# Patient Record
Sex: MALE | Race: OTHER | Employment: OTHER | URBAN - METROPOLITAN AREA
[De-identification: names, ages, dates, MRNs, and addresses within clinical notes are randomized per-mention and may not be internally consistent; named-entity substitution may affect disease eponyms.]

---

## 2020-02-20 PROBLEM — N20.0 STAGHORN CALCULUS: Status: ACTIVE | Noted: 2020-02-20

## 2022-12-28 ENCOUNTER — HOSPITAL ENCOUNTER (OUTPATIENT)
Dept: PREADMISSION TESTING | Age: 67
Discharge: HOME | End: 2022-12-28

## 2022-12-28 VITALS — HEIGHT: 69 IN | BODY MASS INDEX: 20.29 KG/M2 | WEIGHT: 137 LBS

## 2022-12-28 RX ORDER — TAMSULOSIN HYDROCHLORIDE 0.4 MG/1
0.4 CAPSULE ORAL DAILY
COMMUNITY
End: 2023-01-03

## 2022-12-28 RX ORDER — SODIUM CHLORIDE, SODIUM LACTATE, POTASSIUM CHLORIDE, CALCIUM CHLORIDE 600; 310; 30; 20 MG/100ML; MG/100ML; MG/100ML; MG/100ML
125 INJECTION, SOLUTION INTRAVENOUS CONTINUOUS
Status: CANCELLED | OUTPATIENT
Start: 2022-12-28

## 2022-12-28 RX ORDER — LEVOFLOXACIN 5 MG/ML
500 INJECTION, SOLUTION INTRAVENOUS ONCE
Status: CANCELLED | OUTPATIENT
Start: 2022-12-28 | End: 2022-12-28

## 2022-12-28 RX ORDER — AMOXICILLIN AND CLAVULANATE POTASSIUM 875; 125 MG/1; MG/1
TABLET, FILM COATED ORAL EVERY 12 HOURS
COMMUNITY
End: 2023-01-13

## 2022-12-28 NOTE — PERIOP NOTES
PAT phone interview completed. Patient made aware to be NPO. Patient stated he has two doses of COVID vaccination and boosters. Patient made aware to bring proof of COVID vaccination on DOS and not to get COVID test. Patient made aware not to wear jewelry, lotion, oil or spray on DOS. Reviewed surgical skin preparation with patient. Patient stated understanding. Opportunity for questions given. Reviewed expectation of discharge and length of stay. Patient stated he has a ride for discharge and someone to stay with him for 24 hours after procedure. Patient denies MICHAEL, difficult intubation/airway, PONV, MH, pseudocholinesterase deficiency, research studies or clinical trials, prosthetics, or implantable devices. Patient stated he does not have a DNR order. Patient stated he will come 12/19 for labs and EKG.

## 2022-12-29 ENCOUNTER — TRANSCRIBE ORDER (OUTPATIENT)
Dept: REGISTRATION | Age: 67
End: 2022-12-29

## 2022-12-29 ENCOUNTER — HOSPITAL ENCOUNTER (OUTPATIENT)
Dept: PREADMISSION TESTING | Age: 67
Discharge: HOME | End: 2022-12-29
Payer: MEDICARE

## 2022-12-29 DIAGNOSIS — N20.0 URIC ACID NEPHROLITHIASIS: ICD-10-CM

## 2022-12-29 DIAGNOSIS — N20.0 URIC ACID NEPHROLITHIASIS: Primary | ICD-10-CM

## 2022-12-29 LAB
ANION GAP SERPL CALC-SCNC: 5 MMOL/L (ref 3–18)
ATRIAL RATE: 59 BPM
BUN SERPL-MCNC: 8 MG/DL (ref 7–18)
BUN/CREAT SERPL: 10 (ref 12–20)
CALCIUM SERPL-MCNC: 9.9 MG/DL (ref 8.5–10.1)
CALCULATED P AXIS, ECG09: 26 DEGREES
CALCULATED R AXIS, ECG10: 35 DEGREES
CALCULATED T AXIS, ECG11: 37 DEGREES
CHLORIDE SERPL-SCNC: 106 MMOL/L (ref 100–111)
CO2 SERPL-SCNC: 30 MMOL/L (ref 21–32)
CREAT SERPL-MCNC: 0.84 MG/DL (ref 0.6–1.3)
DIAGNOSIS, 93000: NORMAL
ERYTHROCYTE [DISTWIDTH] IN BLOOD BY AUTOMATED COUNT: 13 % (ref 11.6–14.5)
GLUCOSE SERPL-MCNC: 111 MG/DL (ref 74–99)
HCT VFR BLD AUTO: 44.8 % (ref 36–48)
HGB BLD-MCNC: 14.8 G/DL (ref 13–16)
MCH RBC QN AUTO: 30 PG (ref 24–34)
MCHC RBC AUTO-ENTMCNC: 33 G/DL (ref 31–37)
MCV RBC AUTO: 90.9 FL (ref 78–100)
NRBC # BLD: 0 K/UL (ref 0–0.01)
NRBC BLD-RTO: 0 PER 100 WBC
P-R INTERVAL, ECG05: 168 MS
PLATELET # BLD AUTO: 334 K/UL (ref 135–420)
PMV BLD AUTO: 9.3 FL (ref 9.2–11.8)
POTASSIUM SERPL-SCNC: 4.7 MMOL/L (ref 3.5–5.5)
Q-T INTERVAL, ECG07: 392 MS
QRS DURATION, ECG06: 92 MS
QTC CALCULATION (BEZET), ECG08: 388 MS
RBC # BLD AUTO: 4.93 M/UL (ref 4.35–5.65)
SODIUM SERPL-SCNC: 141 MMOL/L (ref 136–145)
VENTRICULAR RATE, ECG03: 59 BPM
WBC # BLD AUTO: 7.7 K/UL (ref 4.6–13.2)

## 2022-12-29 PROCEDURE — 80048 BASIC METABOLIC PNL TOTAL CA: CPT

## 2022-12-29 PROCEDURE — 36415 COLL VENOUS BLD VENIPUNCTURE: CPT

## 2022-12-29 PROCEDURE — 93005 ELECTROCARDIOGRAM TRACING: CPT

## 2022-12-29 PROCEDURE — 85027 COMPLETE CBC AUTOMATED: CPT

## 2023-01-03 ENCOUNTER — ANESTHESIA (OUTPATIENT)
Dept: SURGERY | Age: 68
End: 2023-01-03
Payer: MEDICARE

## 2023-01-03 ENCOUNTER — HOSPITAL ENCOUNTER (OUTPATIENT)
Age: 68
Setting detail: OUTPATIENT SURGERY
Discharge: HOME | End: 2023-01-03
Attending: UROLOGY | Admitting: UROLOGY
Payer: MEDICARE

## 2023-01-03 ENCOUNTER — APPOINTMENT (OUTPATIENT)
Dept: GENERAL RADIOLOGY | Age: 68
End: 2023-01-03
Attending: UROLOGY
Payer: MEDICARE

## 2023-01-03 ENCOUNTER — ANESTHESIA EVENT (OUTPATIENT)
Dept: SURGERY | Age: 68
End: 2023-01-03
Payer: MEDICARE

## 2023-01-03 VITALS
RESPIRATION RATE: 15 BRPM | WEIGHT: 177.5 LBS | BODY MASS INDEX: 26.29 KG/M2 | DIASTOLIC BLOOD PRESSURE: 76 MMHG | HEIGHT: 69 IN | OXYGEN SATURATION: 100 % | HEART RATE: 62 BPM | SYSTOLIC BLOOD PRESSURE: 149 MMHG | TEMPERATURE: 97.2 F

## 2023-01-03 DIAGNOSIS — N20.0 STAGHORN CALCULUS: Primary | ICD-10-CM

## 2023-01-03 PROCEDURE — C1758 CATHETER, URETERAL: HCPCS | Performed by: UROLOGY

## 2023-01-03 PROCEDURE — 77030020782 HC GWN BAIR PAWS FLX 3M -B: Performed by: UROLOGY

## 2023-01-03 PROCEDURE — 74011250636 HC RX REV CODE- 250/636: Performed by: UROLOGY

## 2023-01-03 PROCEDURE — C1894 INTRO/SHEATH, NON-LASER: HCPCS | Performed by: UROLOGY

## 2023-01-03 PROCEDURE — 74011000636 HC RX REV CODE- 636: Performed by: UROLOGY

## 2023-01-03 PROCEDURE — 77030010103 HC SEAL PRT ENDOSC OCOA -B: Performed by: UROLOGY

## 2023-01-03 PROCEDURE — 2709999900 HC NON-CHARGEABLE SUPPLY: Performed by: UROLOGY

## 2023-01-03 PROCEDURE — 77030012508 HC MSK AIRWY LMA AMBU -A: Performed by: ANESTHESIOLOGY

## 2023-01-03 PROCEDURE — 74420 UROGRAPHY RTRGR +-KUB: CPT

## 2023-01-03 PROCEDURE — 76010000162 HC OR TIME 1.5 TO 2 HR INTENSV-TIER 1: Performed by: UROLOGY

## 2023-01-03 PROCEDURE — 74011250636 HC RX REV CODE- 250/636: Performed by: ANESTHESIOLOGY

## 2023-01-03 PROCEDURE — 77030040361 HC SLV COMPR DVT MDII -B: Performed by: UROLOGY

## 2023-01-03 PROCEDURE — 77030020268 HC MISC GENERAL SUPPLY: Performed by: UROLOGY

## 2023-01-03 PROCEDURE — 82360 CALCULUS ASSAY QUANT: CPT

## 2023-01-03 PROCEDURE — C1769 GUIDE WIRE: HCPCS | Performed by: UROLOGY

## 2023-01-03 PROCEDURE — 74011000250 HC RX REV CODE- 250: Performed by: NURSE ANESTHETIST, CERTIFIED REGISTERED

## 2023-01-03 PROCEDURE — 76060000034 HC ANESTHESIA 1.5 TO 2 HR: Performed by: UROLOGY

## 2023-01-03 PROCEDURE — 74011250636 HC RX REV CODE- 250/636: Performed by: NURSE ANESTHETIST, CERTIFIED REGISTERED

## 2023-01-03 PROCEDURE — 76210000006 HC OR PH I REC 0.5 TO 1 HR: Performed by: UROLOGY

## 2023-01-03 PROCEDURE — 76210000021 HC REC RM PH II 0.5 TO 1 HR: Performed by: UROLOGY

## 2023-01-03 PROCEDURE — 77030038846 HC SCPE URETSCP LITHOVUE DISP BSC -H: Performed by: UROLOGY

## 2023-01-03 PROCEDURE — 77030006974 HC BSKT URET RTVR BSC -C: Performed by: UROLOGY

## 2023-01-03 PROCEDURE — C2617 STENT, NON-COR, TEM W/O DEL: HCPCS | Performed by: UROLOGY

## 2023-01-03 DEVICE — VARIABLE LENGTH URETERAL STENT
Type: IMPLANTABLE DEVICE | Site: URETER | Status: FUNCTIONAL
Brand: CONTOUR VL™

## 2023-01-03 RX ORDER — FENTANYL CITRATE 50 UG/ML
25 INJECTION, SOLUTION INTRAMUSCULAR; INTRAVENOUS AS NEEDED
Status: DISCONTINUED | OUTPATIENT
Start: 2023-01-03 | End: 2023-01-03 | Stop reason: HOSPADM

## 2023-01-03 RX ORDER — ONDANSETRON 2 MG/ML
INJECTION INTRAMUSCULAR; INTRAVENOUS AS NEEDED
Status: DISCONTINUED | OUTPATIENT
Start: 2023-01-03 | End: 2023-01-03 | Stop reason: HOSPADM

## 2023-01-03 RX ORDER — ALFUZOSIN HYDROCHLORIDE 10 MG/1
10 TABLET, EXTENDED RELEASE ORAL DAILY
Qty: 30 TABLET | Refills: 2 | Status: SHIPPED | OUTPATIENT
Start: 2023-01-03

## 2023-01-03 RX ORDER — EPHEDRINE SULFATE/0.9% NACL/PF 50 MG/5 ML
SYRINGE (ML) INTRAVENOUS AS NEEDED
Status: DISCONTINUED | OUTPATIENT
Start: 2023-01-03 | End: 2023-01-03 | Stop reason: HOSPADM

## 2023-01-03 RX ORDER — SODIUM CHLORIDE 0.9 % (FLUSH) 0.9 %
5-40 SYRINGE (ML) INJECTION EVERY 8 HOURS
Status: DISCONTINUED | OUTPATIENT
Start: 2023-01-03 | End: 2023-01-03 | Stop reason: HOSPADM

## 2023-01-03 RX ORDER — OXYCODONE AND ACETAMINOPHEN 5; 325 MG/1; MG/1
1 TABLET ORAL
Qty: 15 TABLET | Refills: 0 | Status: SHIPPED | OUTPATIENT
Start: 2023-01-03 | End: 2023-01-08

## 2023-01-03 RX ORDER — MIDAZOLAM HYDROCHLORIDE 1 MG/ML
INJECTION, SOLUTION INTRAMUSCULAR; INTRAVENOUS AS NEEDED
Status: DISCONTINUED | OUTPATIENT
Start: 2023-01-03 | End: 2023-01-03 | Stop reason: HOSPADM

## 2023-01-03 RX ORDER — SODIUM CHLORIDE, SODIUM LACTATE, POTASSIUM CHLORIDE, CALCIUM CHLORIDE 600; 310; 30; 20 MG/100ML; MG/100ML; MG/100ML; MG/100ML
125 INJECTION, SOLUTION INTRAVENOUS CONTINUOUS
Status: DISCONTINUED | OUTPATIENT
Start: 2023-01-03 | End: 2023-01-03 | Stop reason: HOSPADM

## 2023-01-03 RX ORDER — LEVOFLOXACIN 5 MG/ML
500 INJECTION, SOLUTION INTRAVENOUS ONCE
Status: COMPLETED | OUTPATIENT
Start: 2023-01-03 | End: 2023-01-03

## 2023-01-03 RX ORDER — LIDOCAINE HYDROCHLORIDE 20 MG/ML
INJECTION, SOLUTION EPIDURAL; INFILTRATION; INTRACAUDAL; PERINEURAL AS NEEDED
Status: DISCONTINUED | OUTPATIENT
Start: 2023-01-03 | End: 2023-01-03 | Stop reason: HOSPADM

## 2023-01-03 RX ORDER — SODIUM CHLORIDE 0.9 % (FLUSH) 0.9 %
5-40 SYRINGE (ML) INJECTION AS NEEDED
Status: DISCONTINUED | OUTPATIENT
Start: 2023-01-03 | End: 2023-01-03 | Stop reason: HOSPADM

## 2023-01-03 RX ORDER — FENTANYL CITRATE 50 UG/ML
INJECTION, SOLUTION INTRAMUSCULAR; INTRAVENOUS AS NEEDED
Status: DISCONTINUED | OUTPATIENT
Start: 2023-01-03 | End: 2023-01-03 | Stop reason: HOSPADM

## 2023-01-03 RX ORDER — PROPOFOL 10 MG/ML
INJECTION, EMULSION INTRAVENOUS AS NEEDED
Status: DISCONTINUED | OUTPATIENT
Start: 2023-01-03 | End: 2023-01-03 | Stop reason: HOSPADM

## 2023-01-03 RX ORDER — HYDROMORPHONE HYDROCHLORIDE 1 MG/ML
0.5 INJECTION, SOLUTION INTRAMUSCULAR; INTRAVENOUS; SUBCUTANEOUS
Status: DISCONTINUED | OUTPATIENT
Start: 2023-01-03 | End: 2023-01-03 | Stop reason: HOSPADM

## 2023-01-03 RX ADMIN — FENTANYL CITRATE 25 MCG: 50 INJECTION, SOLUTION INTRAMUSCULAR; INTRAVENOUS at 12:54

## 2023-01-03 RX ADMIN — LEVOFLOXACIN 500 MG: 5 INJECTION, SOLUTION INTRAVENOUS at 12:21

## 2023-01-03 RX ADMIN — LIDOCAINE HYDROCHLORIDE 60 MG: 20 INJECTION, SOLUTION EPIDURAL; INFILTRATION; INTRACAUDAL; PERINEURAL at 12:23

## 2023-01-03 RX ADMIN — Medication 10 MG: at 12:34

## 2023-01-03 RX ADMIN — FENTANYL CITRATE 25 MCG: 50 INJECTION, SOLUTION INTRAMUSCULAR; INTRAVENOUS at 14:06

## 2023-01-03 RX ADMIN — FENTANYL CITRATE 25 MCG: 50 INJECTION, SOLUTION INTRAMUSCULAR; INTRAVENOUS at 13:32

## 2023-01-03 RX ADMIN — MIDAZOLAM 2 MG: 1 INJECTION INTRAMUSCULAR; INTRAVENOUS at 12:16

## 2023-01-03 RX ADMIN — FENTANYL CITRATE 50 MCG: 50 INJECTION, SOLUTION INTRAMUSCULAR; INTRAVENOUS at 12:16

## 2023-01-03 RX ADMIN — SODIUM CHLORIDE, SODIUM LACTATE, POTASSIUM CHLORIDE, AND CALCIUM CHLORIDE 125 ML/HR: 600; 310; 30; 20 INJECTION, SOLUTION INTRAVENOUS at 10:52

## 2023-01-03 RX ADMIN — FENTANYL CITRATE 25 MCG: 50 INJECTION, SOLUTION INTRAMUSCULAR; INTRAVENOUS at 13:06

## 2023-01-03 RX ADMIN — FENTANYL CITRATE 25 MCG: 50 INJECTION INTRAMUSCULAR; INTRAVENOUS at 14:45

## 2023-01-03 RX ADMIN — ONDANSETRON HYDROCHLORIDE 4 MG: 2 INJECTION INTRAMUSCULAR; INTRAVENOUS at 12:34

## 2023-01-03 RX ADMIN — PROPOFOL 160 MG: 10 INJECTION, EMULSION INTRAVENOUS at 12:23

## 2023-01-03 RX ADMIN — SODIUM CHLORIDE, SODIUM LACTATE, POTASSIUM CHLORIDE, AND CALCIUM CHLORIDE 125 ML/HR: 600; 310; 30; 20 INJECTION, SOLUTION INTRAVENOUS at 14:59

## 2023-01-03 RX ADMIN — FENTANYL CITRATE 25 MCG: 50 INJECTION INTRAMUSCULAR; INTRAVENOUS at 14:54

## 2023-01-03 RX ADMIN — SODIUM CHLORIDE, SODIUM LACTATE, POTASSIUM CHLORIDE, AND CALCIUM CHLORIDE: 600; 310; 30; 20 INJECTION, SOLUTION INTRAVENOUS at 13:04

## 2023-01-03 RX ADMIN — FENTANYL CITRATE 25 MCG: 50 INJECTION, SOLUTION INTRAMUSCULAR; INTRAVENOUS at 12:37

## 2023-01-03 NOTE — PERIOP NOTES
TRANSFER - OUT REPORT:    Verbal report given to Becky Sr RN on [facility]  being transferred to Phase 2 (unit) for routine post - op       Report consisted of patients Situation, Background, Assessment and   Recommendations(SBAR). Information from the following report(s) SBAR was reviewed with the receiving nurse. Lines:   Peripheral IV 01/03/23 Anterior; Left Forearm (Active)   Site Assessment Clean, dry, & intact 01/03/23 1419   Phlebitis Assessment 0 01/03/23 1419   Infiltration Assessment 0 01/03/23 1419   Dressing Status Clean, dry, & intact 01/03/23 1419   Dressing Type Tape;Transparent 01/03/23 1419   Hub Color/Line Status Pink; Infusing;Patent 01/03/23 1419        Opportunity for questions and clarification was provided.       Patient transported with:   [email or web address]

## 2023-01-03 NOTE — PERIOP NOTES
Date 01/02/23 0700 - 01/03/23 0659(Not Admitted) 01/03/23 0700 - 01/04/23 0659   Shift 3396-6252 9983-1455 24 Hour Total 5634-4690 9665-1449 24 Hour Total   INTAKE   I.V.    1900  1900     I.V.    200  200     Volume (lactated Ringers infusion)    1700  1700   Shift Total(mL/kg)    1900(23.6)  1900(23.6)   OUTPUT   Urine    100  100     Urine Voided    100  100     Urine Occurrence(s)    1 x  1 x   Shift Total(mL/kg)    100(1.2)  100(1.2)   NET    1800  1800   Weight (kg)    80.5 80.5 80.5

## 2023-01-03 NOTE — ANESTHESIA POSTPROCEDURE EVALUATION
Post-Anesthesia Evaluation and Assessment    Cardiovascular Function/Vital Signs  Visit Vitals  BP (!) 143/76   Pulse 61   Temp 36.2 °C (97.2 °F)   Resp 16   Ht 5' 9\" (1.753 m)   Wt 80.5 kg (177 lb 8 oz)   SpO2 97%   BMI 26.21 kg/m²       Patient is status post Procedure(s):  CYSTOSCOPY,BILATERAL RETROGRADE PYELOGRAM,LEFT  URETEROSCOPY WITH THULIUM LASER,BILATERAL STENT PLACEMENT W/C-ARM. Nausea/Vomiting: Controlled. Postoperative hydration reviewed and adequate. Pain:  Pain Scale 1: Visual (01/03/23 1503)  Pain Intensity 1: 0 (01/03/23 1503)   Managed. Neurological Status:   Neuro (WDL): Within Defined Limits (01/03/23 1503)   At baseline. Mental Status and Level of Consciousness: Baseline and stable. Pulmonary Status:   O2 Device: None (Room air) (01/03/23 1503)   Adequate oxygenation and airway patent. Complications related to anesthesia: None    Post-anesthesia assessment completed. No concerns. Patient has met all discharge requirements.     Signed By: Kendell William MD

## 2023-01-03 NOTE — PERIOP NOTES
TRANSFER - IN REPORT:    Verbal report received from SOFY Hinkle(name) on [place]  being received from PACU(unit) for routine progression of care      Report consisted of patients Situation, Background, Assessment and   Recommendations(SBAR). Information from the following report(s) SBAR, Kardex, and MAR was reviewed with the receiving nurse. Opportunity for questions and clarification was provided. Assessment completed upon patients arrival to unit and care assumed.

## 2023-01-03 NOTE — BRIEF OP NOTE
Brief Postoperative Note    Patient: Agapito Roman  YOB: 1955  MRN: [id number]    Date of Procedure: 1/3/2023     Pre-Op Diagnosis: LEFT STAGHORN CALCULUS OF KIDNEY, RIGHT RENAL STONES    Post-Op Diagnosis: Same as preoperative diagnosis. Procedure(s):  CYSTOSCOPY, LEFT RETROGRADE PYELOGRAM WITH INTERPRETATION, LEFT  URETEROSCOPY WITH THULIUM LASER LITHOTRIPSY AND STENT PLACEMENT (COMPLICATED), RIGHT RETROGRADE PYELOGRAM WITH INTERPRETATION, RIGHT URETERAL STENT PLACEMENT W/C-ARM    Surgeon(s):  Codi Ram MD    Surgical Assistant: NONE    Anesthesia: General     Estimated Blood Loss (mL): Minimal    Complications: None    Specimens:   ID Type Source Tests Collected by Time Destination   1 : LEFT KIDNEY STONES Fresh Kidney  Codi Ram MD 1/3/2023 1408 Pathology        Implants:   Implant Name Type Inv. Item Serial No.  Lot No. LRB No. Used Action   STENT URET 4.8FR [id number] PERCFLX HYDR+ SFT PGTL TAPR TIP - [id number]  STENT URET 4.8FR [id number] PERCFLX HYDR+ SFT PGTL TAPR TIP  [employer]_ [id number] Left 1 Implanted   STENT URET 4.8FR [id number] PERCFLX HYDR+ SFT PGTL TAPR TIP - [id number]  STENT URET 4.8FR [id number] PERCFLX HYDR+ SFT PGTL TAPR TIP  [employer]_WD [id number] Right 1 Implanted       Drains: * No LDAs found *    Findings: HUGE DENSE STAGHORN STONE IN LEFT KIDNEY. LASERED INTO TINY DUST AND DEBRIS. THERE WERE NO INJURIES OR COMPLICATIONS, BUT IT WAS A VERY PROLONGED CASE AND SIGNIFICANTLY MORE DIFFICULT THAN A TYPICAL URETEROSCOPY. AT END OF CASE, THERE WAS LOTS OF FINE DEBRIS AND NO  OBVIOUS CHUNKS, BUT VISUALIZATION WAS DIFFICULT.       Electronically Signed by Orville Barthel, MD on 1/3/2023 at 2:29 PM

## 2023-01-03 NOTE — H&P
Urology [address] [address]  Tel: [phone]  Fax: [phone]    Patient : Sarah Hamlin   YOB: 1955                   Assessment/Plan  # Detail Type Description    Assessment Calculus of kidney (N20.0). Patient Plan He has a large left-sided partial staghorn stone and several renal stones on the right as well. We discussed ureteroscopy versus PCNL. After thorough discussion he will proceed with cystoscopy LEFT retrograde pyelogram and LEFT  ureteroscopy with thulium laser lithotripsy and stent placement. Risks of bleeding and infection injury and possible need for more than 1 procedure to made stone free were discussed. At the time of his left ureteroscopy I will do a right retrograde pyelogram and place a stent. Likely then in follow-up he would need a bilateral ureteroscopy a few weeks later and I would treat the right-sided stones at his 2nd procedure and also make sure that any left-sided residual fragments were treated as well. 2. Assessment Acute cystitis without hematuria (N30.00). Patient Plan He is currently doing pretty well on his amoxicillin. We discussed that there may be a bacteria that resides on the stone and certainly there is a chance for postoperative UTI. Additional Visit Information      This 79year old male presents for Kidney stones and UTI. History of Present Illness  1. Kidney stones   Onset was 3 months ago. Severity level is moderate-severe. It occurs intermittently. The problem is with no change. Location of pain is left flank. Prior stone type of calcium oxalate monohydrate and Calcium oxalate di-hydrate. Recent ER visit. Pertinent history includes history of prior stone(s). Associated symptoms include back pain, hematuria and pain.  Pertinent negatives include bloating, chills, constipation, diarrhea, dysuria, fever, nausea, pelvic pain, penile pain, suprapubic pain, urinary blood clots, dribbling (urinary), frequency (urinary), urinary straining and vomiting. Additional information: New pt today with hx of urosepsis that he believes is from a UTI after a cystoscopy. Admitted to [facility] in [address] [place] [address] treated with IV antibiotics in November 15, 2022. Patient was then seen 10/17/22 at [facility] for another episode of sepsis secondary to UTI. Patient has a known right staghorn calculus that measures 1.8 x 1.0 x 1.7 cm in the central left lower pole. He has multiple stones in the right kidney. .        2.  UTI   The onset was 2 weeks ago. The severity of the problem is moderate. The problem is improving. Pertinent history includes being over 50 and alcohol consumption. Associated symptoms include hematuria. Pertinent negatives include constipation, dysuria, dribbling, urinary frequency, pelvic pain and penile pain. Additional information: He has been on amoxicillin 875 mg for 10 days total starting 12/14/2022.       12/21/2022 -- he denies any current fevers. Appetite is okay. Medical/Surgical/Interim History  Reviewed, no change. Last detailed document date:12/15/2022. Problem List  Problem List reviewed. Medications (active prior to today)  Medication Instructions Start Date Stop Date Refilled Elsewhere   tamsulosin 0.4 mg capsule take 1 capsule by oral route  every day 1/2 hour following the same meal each day 12/15/2022   N       Medication Reconciliation  Medications reconciled today.     Medication Reviewed  Adherence Medication Name Sig Desc Elsewhere Status   taking as directed tamsulosin 0.4 mg capsule take 1 capsule by oral route  every day 1/2 hour following the same meal each day N Verified   taking as directed amoxicillin 875 mg-potassium clavulanate 125 mg tablet take 1 tablet by oral route  every 12 hours   -- start 2 days before surgery N Verified     Allergies  Ingredient Reaction (Severity) Medication Name Comment   NO KNOWN ALLERGIES        Reviewed, no changes. Family History  Reviewed, no changes. Last detailed document date:12/15/2022. Social History  Reviewed, no changes. Last detailed document date: 12/15/2022. Review of Systems  System Neg/Pos Details   Constitutional Positive Pain. Constitutional Negative Chills and Fever. ENMT Negative Ear infections and Sore throat. Eyes Negative Blurred vision, Double vision and Eye pain. Respiratory Negative Asthma, Chronic cough, Dyspnea and Wheezing. Cardio Negative Chest pain. GI Negative Bloating, Constipation, Decreased appetite, Diarrhea, Nausea and Vomiting.  Positive Back pain, Hematuria.  Negative Dysuria, Pelvic pain, Penile pain, Suprapubic pain, Urinary blood clots, Urinary dribbling, Urinary frequency and Urinary straining. Endocrine Negative Cold intolerance, Heat intolerance, Increased thirst and Weight loss. Neuro Negative Headache and Tremors. Psych Negative Anxiety and Depression. Integumentary Negative Itching skin and Rash. MS Negative Back pain and Joint pain. Hema/Lymph Negative Easy bleeding. Reproductive Negative Sexual dysfunction. Vital Signs   Height  Time ft in cm Last Measured Height Position   8:34 AM 5.0 9.00 175.26 12/15/2022 0     Weight/BSA/BMI  Time lb oz kg Context BMI kg/m2 BSA m2   8:34 .00  80.286  26.14      Measured by  Time Measured by   8:34 AM Cherie Wilkins     Physical Exam  Exam Findings Details   Constitutional Normal Well developed. Neck Exam Normal Inspection - Normal.   Respiratory Normal Inspection - Normal.   Abdomen Normal No abdominal tenderness. Genitourinary Normal No CVA tenderness. No suprapubic tenderness. Extremity Normal No edema. Psychiatric Normal Orientation - Oriented to time, place, person & situation. Appropriate mood and affect.      Medications (added, continued, or stopped today)  Start Date Medication Directions PRN Status PRN Reason Instruction Stop Date 12/21/2022 amoxicillin 875 mg-potassium clavulanate 125 mg tablet take 1 tablet by oral route  every 12 hours   -- start 2 days before surgery N      12/15/2022 tamsulosin 0.4 mg capsule take 1 capsule by oral route  every day 1/2 hour following the same meal each day N          Active Patient Care Team Members  Name Contact Agency Type Support Role Relationship Active Date Inactive Date Specialty   [facility]   Patient provider PCP          Provider:        Cee Clark MD

## 2023-01-03 NOTE — PERIOP NOTES
Reviewed PTA medication list with patient/caregiver and patient/caregiver denies any additional medications. Patient admits to having a responsible adult care for them at home for at least 24 hours after surgery. Patient encouraged to use gown warming system and informed that using said warming gown to regulate body temperature prior to a procedure has been shown to help reduce the risks of blood clots and infection. Patient's pharmacy of choice verified and documented in PTA medication section.     Dual skin assessment & fall risk band verification completed with Marlys Feliciano RN.

## 2023-01-03 NOTE — DISCHARGE INSTRUCTIONS
DISCHARGE SUMMARY from Nurse    PATIENT INSTRUCTIONS:    After general anesthesia or intravenous sedation, for 24 hours or while taking prescription Narcotics:  Limit your activities  Do not drive and operate hazardous machinery  Do not make important personal or business decisions  Do  not drink alcoholic beverages  If you have not urinated within 8 hours after discharge, please contact your surgeon on call. Report the following to your surgeon:  Excessive pain, swelling, redness or odor of or around the surgical area  Temperature over 100.5  Nausea and vomiting lasting longer than 4 hours or if unable to take medications  Any signs of decreased circulation or nerve impairment to extremity: change in color, persistent  numbness, tingling, coldness or increase pain  Any questions    What to do at Home:  Recommended activity: Ambulate in house and No lifting, Driving, or Strenuous exercise until advised    *  Please give a list of your current medications to your Primary Care Provider. *  Please update this list whenever your medications are discontinued, doses are      changed, or new medications (including over-the-counter products) are added. *  Please carry medication information at all times in case of emergency situations. These are general instructions for a healthy lifestyle:    No smoking/ No tobacco products/ Avoid exposure to second hand smoke  Surgeon General's Warning:  Quitting smoking now greatly reduces serious risk to your health.     Obesity, smoking, and sedentary lifestyle greatly increases your risk for illness    A healthy diet, regular physical exercise & weight monitoring are important for maintaining a healthy lifestyle    You may be retaining fluid if you have a history of heart failure or if you experience any of the following symptoms:  Weight gain of 3 pounds or more overnight or 5 pounds in a week, increased swelling in our hands or feet or shortness of breath while lying flat in bed. Please call your doctor as soon as you notice any of these symptoms; do not wait until your next office visit. Patient armband removed and shredded     The discharge information has been reviewed with the patient and caregiver. The patient and caregiver verbalized understanding. Discharge medications reviewed with the patient and caregiver and appropriate educational materials and side effects teaching were provided. ___________________________________________________________________________________________________________________________________     Cystoscopy: What to Expect at [facility]     A cystoscopy is a procedure that lets a doctor look inside of the bladder and the urethra. The urethra is the tube that carries urine from the bladder to outside the body. The doctor uses a thin, lighted tool called a cystoscope. Your bladder is filled with fluid. This stretches the bladder so that your doctor can look closely at the inside of your bladder. After the cystoscopy, your urethra may be sore at first, and it may burn when you urinate for the first few days after the procedure. You may feel the need to urinate more often, and your urine may be pink. These symptoms should get better in 1 or 2 days. You will probably be able to go back to most of your usual activities in 1 or 2 days. This care sheet gives you a general idea about how long it will take for you to recover. But each person recovers at a different pace. Follow the steps below to get better as quickly as possible. How can you care for yourself at home? Activity    Rest when you feel tired. Getting enough sleep will help you recover. Try to walk each day. Start by walking a little more than you did the day before. Bit by bit, increase the amount you walk. Walking boosts blood flow and helps prevent pneumonia and constipation.      Avoid strenuous activities, such as bicycle riding, jogging, weight lifting, or aerobic exercise, until your doctor says it is okay. Ask your doctor when you can drive again. Most people are able to return to work within 1 or 2 days after the procedure. You may shower and take baths as usual.     Ask your doctor when it is okay for you to have sex. Diet    You can eat your normal diet. If your stomach is upset, try bland, low-fat foods like plain rice, broiled chicken, toast, and yogurt. Drink plenty of fluids (unless your doctor tells you not to). Medicines    Take pain medicines exactly as directed. If the doctor gave you a prescription medicine for pain, take it as prescribed. If you are not taking a prescription pain medicine, ask your doctor if you can take an over-the-counter medicine. If you think your pain medicine is making you sick to your stomach: Take your medicine after meals (unless your doctor has told you not to). Ask your doctor for a different pain medicine. If your doctor prescribed antibiotics, take them as directed. Do not stop taking them just because you feel better. You need to take the full course of antibiotics. Follow-up care is a key part of your treatment and safety. Be sure to make and go to all appointments, and call your doctor if you are having problems. It's also a good idea to know your test results and keep a list of the medicines you take. When should you call for help? Call 911 anytime you think you may need emergency care. For example, call if:    You passed out (lost consciousness). You have severe trouble breathing. You have sudden chest pain and shortness of breath, or you cough up blood. You have severe belly pain. Call your doctor now or seek immediate medical care if:    You are sick to your stomach or cannot keep fluids down. Your urine is still red or you see blood clots after you have urinated several times.      You have trouble passing urine or stool, especially if you have pain or swelling in your lower belly. You have signs of a blood clot, such as:  Pain in your calf, back of the knee, thigh, or groin. Redness and swelling in your leg or groin. You develop a fever or severe chills. You have pain in your back just below your rib cage. This is called flank pain. Watch closely for changes in your health, and be sure to contact your doctor if:    You have pain or burning when you urinate. A burning feeling is normal for a day or two after the test, but call if it does not get better. You have a frequent urge to urinate but can pass only small amounts of urine. Your urine is pink, red, or cloudy, or smells bad. It is normal for the urine to have a pinkish color for a few days after the test, but call if it does not get better. Where can you learn more? Go to http://www.gray.com/  Enter C842 in the search box to learn more about \"Cystoscopy: What to Expect at Home. \"  Current as of: June 16, 2022               Content Version: 13.4  © 0661-7213 [employer]. Care instructions adapted under license by [employer] (which disclaims liability or warranty for this information). If you have questions about a medical condition or this instruction, always ask your healthcare professional. [address] any warranty or liability for your use of this information.

## 2023-01-03 NOTE — ANESTHESIA PREPROCEDURE EVALUATION
Relevant Problems   RENAL FAILURE   (+) Staghorn calculus       Anesthetic History   No history of anesthetic complications            Review of Systems / Medical History  Patient summary reviewed, nursing notes reviewed and pertinent labs reviewed    Pulmonary  Within defined limits                 Neuro/Psych   Within defined limits           Cardiovascular  Within defined limits                Exercise tolerance: >4 METS     GI/Hepatic/Renal     GERD           Endo/Other        Arthritis     Other Findings              Physical Exam    Airway  Mallampati: I  TM Distance: 4 - 6 cm  Neck ROM: normal range of motion   Mouth opening: Normal     Cardiovascular  Regular rate and rhythm,  S1 and S2 normal,  no murmur, click, rub, or gallop  Rhythm: regular  Rate: normal         Dental    Dentition: Edentulous     Pulmonary  Breath sounds clear to auscultation               Abdominal  GI exam deferred       Other Findings            Anesthetic Plan    ASA: 2  Anesthesia type: general          Induction: Intravenous  Anesthetic plan and risks discussed with: Patient

## 2023-01-03 NOTE — PERIOP NOTES
TRANSFER - IN REPORT:    Verbal report received from ORN & CRNA on [facility]  being received from OR(unit) for routine post - op      Report consisted of patients Situation, Background, Assessment and   Recommendations(SBAR). Information from the following report(s) SBAR was reviewed with the receiving nurse. Opportunity for questions and clarification was provided. Assessment completed upon patients arrival to unit and care assumed.

## 2023-01-04 NOTE — OP NOTES
[facility]  OPERATIVE REPORT    Name:  Maikel Benitez  MR#:   [id number]  :  1955  ACCOUNT #:  [de-identified]  DATE OF SERVICE:  2023    PREOPERATIVE DIAGNOSIS:  Staghorn calculus, left kidney. POSTOPERATIVE DIAGNOSIS:  Staghorn calculus, left kidney. PROCEDURES PERFORMED:  Cystoscopy, left retrograde pyelogram with interpretation, left ureteroscopy with thulium laser lithotripsy and stent placement. Complicated right retrograde pyelogram with interpretation, right ureteral stent placement. SURGEON:  Zahra Fang MD    ASSISTANT:  None. ANESTHESIA:  General.    COMPLICATIONS:  None. SPECIMENS REMOVED:  Left kidney stone. IMPLANTS:  A 4.8 Italian variable-length stent bilaterally. ESTIMATED BLOOD LOSS:  Minimal.    DRAINS:  None. FINDINGS:  The patient had a huge dense staghorn stone of left kidney. It was lasered into tiny dust and debris. There were no injuries or complications but it was a very prolonged case and significantly more difficult than a typical ureteroscopy necessitating a complicated modifier. At the end of the case, there was lots of fine debris and no obvious chunks but visualization was difficult. The right-sided stent was placed without difficulty in preparation for his upcoming right ureteroscopy. PROCEDURE:  Preoperatively, risks and benefits of surgery were described to the patient. The risks included but were not limited to bleeding, infection, injury to the bladder, injury to the ureter, injury to the kidney, possible need for more than one procedure to be made stone-free. In fact given the sheer size of the stone, he was told he could almost guarantee that it would take more than one procedure to be made stone-free on the left. The patient understood the risks and signed the informed consent. The patient was taken to the operating room and placed on the OR table in supine position. He was administered general anesthetic.   He was administered intravenous antibiotics. He was placed in dorsal lithotomy position and prepped and draped in the usual sterile manner. A 22-Marshallese cystoscope and sheath were then inserted transurethrally atraumatically under direct vision using a 30-degree lens. Panendoscopy was done. Bilateral ureteral orifices were in normal anatomic position. There were no stones, no tumors, and no areas of concern. I cannulated the left ureteral orifice with a 5-Marshallese open-ended ureteral catheter and retrograde pyelogram was done in the usual manner using 15 mL of Optiray dye. There were no filling defects in the distal, mid, or proximal ureter. However in the kidney, there was a very large filling defect in the renal pelvis extending into the lower pole and upper pole. There was some upper pole dilation without blunted calyces. Next, I passed a sensor wire through the open-ended catheter up to the kidney. The open-ended catheter was removed. The scope was removed. I then passed a dual-lumen catheter and passed a second sensor wire up to the kidney. The dual-lumen catheter was removed. One wire was a safety wire, the other wire was a working wire. Over the working wire, I passed an 11/13, 46-cm navigator sheath over the wire up into the proximal ureter. Then the inner workings of the sheath and the working wire were removed. I then passed a flexible ureteroscope through the sheath up to the kidney. Panpyeloscopy was done. I immediately came upon the huge stone in the renal pelvis extending down to the UPJ. Using a 200-micron thulium laser fiber, I broke up the stone into small dust using a dusting setting. This was an extremely large dense stone that required a prolonged laser time as the surgery itself was about 2 hours. I eventually lasered so much that eventually the laser fiber failed and I had to replace it with another 200-micron thulium laser fiber.   I continued to laser the stone until at the end there was just nothing but small dust and debris, but of course, visualization was very difficult just due to the sheer volume. I went into the lower pole, into the renal pelvis, and into the upper pole, wherever stone was identified to laser. I used a Zero Tip basket to grab a few of the pieces out to be sent off for analysis. At this point, I did not see any significant chunks and the hope is that the lot of the dust and debris will just pass. I then slowly brought down my sheath and the scope down the ureter. The ureter was intact and unharmed. There were no significant fragments along the length of the ureter. I then reinserted the cystoscope. Over the safety wire, I passed a 4.8-Mongolian variable-length stent. The wire was removed. Fluoroscopy confirmed the proximal coil was in the kidney. The distal coil was noted to be in the bladder by direct vision. At this point, I then cannulated the right ureteral orifice with a 5-Mongolian open-ended ureteral catheter and retrograde pyelogram was done in the usual manner using 10 mL of Optiray dye. There were no filling defects along the entire length of the ureter or up in the kidney. No hydronephrosis. At this point, I passed a 0.035 sensor wire through the open-ended catheter up to the kidney. The open-ended catheter was removed. I then passed a 4.8-Mongolian variable-length stent over the wire up to the kidney. The wire was removed. Fluoroscopy confirmed the proximal coil was in the kidney. The distal coil was noted to be in the bladder by direct vision. At this point, the patient was taken out of lithotomy position, revived from anesthesia, and taken to Recovery in stable condition.       Bonnie Barnes MD      [id number]  D:  01/03/2023 14:43  T:  01/03/2023 23:04  JOB #:  [id number]

## 2023-01-08 LAB
CALCIUM OXALATE DIHYDRATE MFR STONE IR: 10 %
COLOR STONE: NORMAL
COM MFR STONE: 85 %
COMMENT, 120677: NORMAL
COMMENT, 519994: NORMAL
COMPOSITION, 120440: NORMAL
DISCLAIMER, STO32L: NORMAL
HYDROXYAPATITE: 5 %
PHOTO, 120675: NORMAL
PLEASE NOTE, 130422: NORMAL
SIZE STONE: NORMAL MM
SPECIMEN SOURCE: NORMAL
STONE ANALYSIS-IMP: NORMAL
WT STONE: 37 MG

## 2023-01-13 ENCOUNTER — HOSPITAL ENCOUNTER (OUTPATIENT)
Dept: PREADMISSION TESTING | Age: 68
Discharge: HOME | End: 2023-01-13

## 2023-01-13 VITALS — WEIGHT: 177 LBS | HEIGHT: 69 IN | BODY MASS INDEX: 26.22 KG/M2

## 2023-01-13 RX ORDER — LEVOFLOXACIN 5 MG/ML
500 INJECTION, SOLUTION INTRAVENOUS ONCE
Status: CANCELLED | OUTPATIENT
Start: 2023-01-13 | End: 2023-01-13

## 2023-01-13 RX ORDER — PHENAZOPYRIDINE HYDROCHLORIDE 200 MG/1
200 TABLET, FILM COATED ORAL
Status: ON HOLD | COMMUNITY
End: 2023-01-17 | Stop reason: SDUPTHER

## 2023-01-13 RX ORDER — KETOROLAC TROMETHAMINE 10 MG/1
TABLET, FILM COATED ORAL
Status: ON HOLD | COMMUNITY
End: 2023-01-17 | Stop reason: SDUPTHER

## 2023-01-13 RX ORDER — OXYCODONE AND ACETAMINOPHEN 5; 325 MG/1; MG/1
TABLET ORAL
Status: ON HOLD | COMMUNITY
End: 2023-01-17 | Stop reason: SDUPTHER

## 2023-01-13 RX ORDER — SODIUM CHLORIDE, SODIUM LACTATE, POTASSIUM CHLORIDE, CALCIUM CHLORIDE 600; 310; 30; 20 MG/100ML; MG/100ML; MG/100ML; MG/100ML
125 INJECTION, SOLUTION INTRAVENOUS CONTINUOUS
Status: CANCELLED | OUTPATIENT
Start: 2023-01-13

## 2023-01-13 NOTE — PERIOP NOTES
PAT - SURGICAL PRE-ADMISSION INSTRUCTIONS    NAME:  Hanny Ortega                                                          TODAY'S DATE:  1/13/2023    SURGERY DATE:  1/17/2023                                  SURGERY ARRIVAL TIME:   tba    Do NOT eat or drink anything, including candy or gum, after MIDNIGHT on 1-17 , unless you have specific instructions from your Surgeon or Anesthesia Provider to do so. No smoking 24 hours before surgery. No recreational drugs for one week prior to the day of surgery. Leave all valuables, including money/purse, at home. Remove all jewelry, nail polish, makeup (including mascara); no lotions, powders, deodorant, or perfume/cologne/after shave. Glasses/Contact lenses and Dentures may be worn to the hospital.  They will be removed prior to surgery. Call your doctor if symptoms of a cold or illness develop within 24 ours prior to surgery. AN ADULT MUST DRIVE YOU HOME AFTER OUTPATIENT SURGERY. If you are having an OUTPATIENT procedure, please make arrangements for a responsible adult to be with you for 24 hours after your surgery. If you are admitted to the hospital, you will be assigned to a bed after surgery is complete. Normally a family member will not be able to see you until you are in your assigned bed. 12. Visitation Restrictions Explained. NONE.     Patient Prep:    shower with anti-bacterial soap     These surgical instructions were reviewed with the pt during the PAT phone call

## 2023-01-17 ENCOUNTER — ANESTHESIA (OUTPATIENT)
Dept: SURGERY | Age: 68
End: 2023-01-17
Payer: MEDICARE

## 2023-01-17 ENCOUNTER — ANESTHESIA EVENT (OUTPATIENT)
Dept: SURGERY | Age: 68
End: 2023-01-17
Payer: MEDICARE

## 2023-01-17 ENCOUNTER — APPOINTMENT (OUTPATIENT)
Dept: GENERAL RADIOLOGY | Age: 68
End: 2023-01-17
Attending: UROLOGY
Payer: MEDICARE

## 2023-01-17 ENCOUNTER — HOSPITAL ENCOUNTER (OUTPATIENT)
Age: 68
Setting detail: OUTPATIENT SURGERY
Discharge: HOME | End: 2023-01-17
Attending: UROLOGY | Admitting: UROLOGY
Payer: MEDICARE

## 2023-01-17 VITALS
OXYGEN SATURATION: 100 % | SYSTOLIC BLOOD PRESSURE: 128 MMHG | DIASTOLIC BLOOD PRESSURE: 68 MMHG | BODY MASS INDEX: 26.45 KG/M2 | RESPIRATION RATE: 15 BRPM | HEIGHT: 69 IN | WEIGHT: 178.6 LBS | TEMPERATURE: 97.5 F | HEART RATE: 62 BPM

## 2023-01-17 DIAGNOSIS — N20.0 STAGHORN CALCULUS: Primary | ICD-10-CM

## 2023-01-17 PROCEDURE — 74011000250 HC RX REV CODE- 250: Performed by: SPECIALIST

## 2023-01-17 PROCEDURE — 76210000006 HC OR PH I REC 0.5 TO 1 HR: Performed by: UROLOGY

## 2023-01-17 PROCEDURE — 74011000250 HC RX REV CODE- 250: Performed by: NURSE ANESTHETIST, CERTIFIED REGISTERED

## 2023-01-17 PROCEDURE — C1894 INTRO/SHEATH, NON-LASER: HCPCS | Performed by: UROLOGY

## 2023-01-17 PROCEDURE — 74420 UROGRAPHY RTRGR +-KUB: CPT

## 2023-01-17 PROCEDURE — 76010000153 HC OR TIME 1.5 TO 2 HR: Performed by: UROLOGY

## 2023-01-17 PROCEDURE — 77030006974 HC BSKT URET RTVR BSC -C: Performed by: UROLOGY

## 2023-01-17 PROCEDURE — 77030016638: Performed by: UROLOGY

## 2023-01-17 PROCEDURE — 74011250636 HC RX REV CODE- 250/636: Performed by: SPECIALIST

## 2023-01-17 PROCEDURE — 76210000021 HC REC RM PH II 0.5 TO 1 HR: Performed by: UROLOGY

## 2023-01-17 PROCEDURE — 77030020782 HC GWN BAIR PAWS FLX 3M -B: Performed by: UROLOGY

## 2023-01-17 PROCEDURE — 74011250636 HC RX REV CODE- 250/636: Performed by: UROLOGY

## 2023-01-17 PROCEDURE — 76060000034 HC ANESTHESIA 1.5 TO 2 HR: Performed by: UROLOGY

## 2023-01-17 PROCEDURE — 2709999900 HC NON-CHARGEABLE SUPPLY: Performed by: UROLOGY

## 2023-01-17 PROCEDURE — 77030020268 HC MISC GENERAL SUPPLY: Performed by: UROLOGY

## 2023-01-17 PROCEDURE — 77030012508 HC MSK AIRWY LMA AMBU -A: Performed by: SPECIALIST

## 2023-01-17 PROCEDURE — 77030010103 HC SEAL PRT ENDOSC OCOA -B: Performed by: UROLOGY

## 2023-01-17 PROCEDURE — C2617 STENT, NON-COR, TEM W/O DEL: HCPCS | Performed by: UROLOGY

## 2023-01-17 PROCEDURE — C1758 CATHETER, URETERAL: HCPCS | Performed by: UROLOGY

## 2023-01-17 PROCEDURE — C1769 GUIDE WIRE: HCPCS | Performed by: UROLOGY

## 2023-01-17 PROCEDURE — 74011250636 HC RX REV CODE- 250/636: Performed by: NURSE ANESTHETIST, CERTIFIED REGISTERED

## 2023-01-17 PROCEDURE — 77030038846 HC SCPE URETSCP LITHOVUE DISP BSC -H: Performed by: UROLOGY

## 2023-01-17 PROCEDURE — 82360 CALCULUS ASSAY QUANT: CPT

## 2023-01-17 PROCEDURE — 74011000636 HC RX REV CODE- 636: Performed by: UROLOGY

## 2023-01-17 PROCEDURE — 77030040361 HC SLV COMPR DVT MDII -B: Performed by: UROLOGY

## 2023-01-17 DEVICE — VARIABLE LENGTH URETERAL STENT
Type: IMPLANTABLE DEVICE | Site: URETER | Status: FUNCTIONAL
Brand: CONTOUR VL™

## 2023-01-17 RX ORDER — LIDOCAINE HYDROCHLORIDE 20 MG/ML
INJECTION, SOLUTION EPIDURAL; INFILTRATION; INTRACAUDAL; PERINEURAL AS NEEDED
Status: DISCONTINUED | OUTPATIENT
Start: 2023-01-17 | End: 2023-01-17 | Stop reason: HOSPADM

## 2023-01-17 RX ORDER — GLYCOPYRROLATE 0.2 MG/ML
INJECTION INTRAMUSCULAR; INTRAVENOUS AS NEEDED
Status: DISCONTINUED | OUTPATIENT
Start: 2023-01-17 | End: 2023-01-17 | Stop reason: HOSPADM

## 2023-01-17 RX ORDER — OXYCODONE AND ACETAMINOPHEN 5; 325 MG/1; MG/1
1 TABLET ORAL
Qty: 30 TABLET | Refills: 0 | Status: SHIPPED | OUTPATIENT
Start: 2023-01-17 | End: 2023-01-25

## 2023-01-17 RX ORDER — PHENAZOPYRIDINE HYDROCHLORIDE 200 MG/1
200 TABLET, FILM COATED ORAL
Qty: 21 TABLET | Refills: 1 | Status: SHIPPED | OUTPATIENT
Start: 2023-01-17

## 2023-01-17 RX ORDER — SODIUM CHLORIDE, SODIUM LACTATE, POTASSIUM CHLORIDE, CALCIUM CHLORIDE 600; 310; 30; 20 MG/100ML; MG/100ML; MG/100ML; MG/100ML
125 INJECTION, SOLUTION INTRAVENOUS CONTINUOUS
Status: DISCONTINUED | OUTPATIENT
Start: 2023-01-17 | End: 2023-01-17 | Stop reason: HOSPADM

## 2023-01-17 RX ORDER — AMOXICILLIN 875 MG/1
875 TABLET, FILM COATED ORAL 2 TIMES DAILY
COMMUNITY
Start: 2022-12-13 | End: 2023-01-17

## 2023-01-17 RX ORDER — LEVOFLOXACIN 5 MG/ML
500 INJECTION, SOLUTION INTRAVENOUS ONCE
Status: COMPLETED | OUTPATIENT
Start: 2023-01-17 | End: 2023-01-17

## 2023-01-17 RX ORDER — ONDANSETRON 2 MG/ML
INJECTION INTRAMUSCULAR; INTRAVENOUS AS NEEDED
Status: DISCONTINUED | OUTPATIENT
Start: 2023-01-17 | End: 2023-01-17 | Stop reason: HOSPADM

## 2023-01-17 RX ORDER — FENTANYL CITRATE 50 UG/ML
25 INJECTION, SOLUTION INTRAMUSCULAR; INTRAVENOUS
Status: DISCONTINUED | OUTPATIENT
Start: 2023-01-17 | End: 2023-01-17 | Stop reason: HOSPADM

## 2023-01-17 RX ORDER — ONDANSETRON 2 MG/ML
4 INJECTION INTRAMUSCULAR; INTRAVENOUS ONCE
Status: DISCONTINUED | OUTPATIENT
Start: 2023-01-17 | End: 2023-01-17 | Stop reason: HOSPADM

## 2023-01-17 RX ORDER — KETOROLAC TROMETHAMINE 10 MG/1
10 TABLET, FILM COATED ORAL
Qty: 20 TABLET | Refills: 0 | Status: SHIPPED | OUTPATIENT
Start: 2023-01-17

## 2023-01-17 RX ORDER — PROPOFOL 10 MG/ML
INJECTION, EMULSION INTRAVENOUS AS NEEDED
Status: DISCONTINUED | OUTPATIENT
Start: 2023-01-17 | End: 2023-01-17 | Stop reason: HOSPADM

## 2023-01-17 RX ORDER — EPHEDRINE SULFATE/0.9% NACL/PF 50 MG/5 ML
SYRINGE (ML) INTRAVENOUS AS NEEDED
Status: DISCONTINUED | OUTPATIENT
Start: 2023-01-17 | End: 2023-01-17 | Stop reason: HOSPADM

## 2023-01-17 RX ORDER — OXYCODONE AND ACETAMINOPHEN 5; 325 MG/1; MG/1
1 TABLET ORAL AS NEEDED
Status: DISCONTINUED | OUTPATIENT
Start: 2023-01-17 | End: 2023-01-17 | Stop reason: HOSPADM

## 2023-01-17 RX ORDER — DEXAMETHASONE SODIUM PHOSPHATE 4 MG/ML
INJECTION, SOLUTION INTRA-ARTICULAR; INTRALESIONAL; INTRAMUSCULAR; INTRAVENOUS; SOFT TISSUE AS NEEDED
Status: DISCONTINUED | OUTPATIENT
Start: 2023-01-17 | End: 2023-01-17 | Stop reason: HOSPADM

## 2023-01-17 RX ORDER — KETOROLAC TROMETHAMINE 15 MG/ML
INJECTION, SOLUTION INTRAMUSCULAR; INTRAVENOUS AS NEEDED
Status: DISCONTINUED | OUTPATIENT
Start: 2023-01-17 | End: 2023-01-17 | Stop reason: HOSPADM

## 2023-01-17 RX ORDER — MIDAZOLAM HYDROCHLORIDE 1 MG/ML
INJECTION, SOLUTION INTRAMUSCULAR; INTRAVENOUS AS NEEDED
Status: DISCONTINUED | OUTPATIENT
Start: 2023-01-17 | End: 2023-01-17 | Stop reason: HOSPADM

## 2023-01-17 RX ORDER — SODIUM CHLORIDE, SODIUM LACTATE, POTASSIUM CHLORIDE, CALCIUM CHLORIDE 600; 310; 30; 20 MG/100ML; MG/100ML; MG/100ML; MG/100ML
50 INJECTION, SOLUTION INTRAVENOUS CONTINUOUS
Status: DISCONTINUED | OUTPATIENT
Start: 2023-01-17 | End: 2023-01-17 | Stop reason: HOSPADM

## 2023-01-17 RX ORDER — KETAMINE HYDROCHLORIDE 10 MG/ML
INJECTION INTRAMUSCULAR; INTRAVENOUS AS NEEDED
Status: DISCONTINUED | OUTPATIENT
Start: 2023-01-17 | End: 2023-01-17 | Stop reason: HOSPADM

## 2023-01-17 RX ORDER — NALOXONE HYDROCHLORIDE 0.4 MG/ML
0.1 INJECTION, SOLUTION INTRAMUSCULAR; INTRAVENOUS; SUBCUTANEOUS
Status: DISCONTINUED | OUTPATIENT
Start: 2023-01-17 | End: 2023-01-17 | Stop reason: HOSPADM

## 2023-01-17 RX ORDER — HYDROMORPHONE HYDROCHLORIDE 1 MG/ML
INJECTION, SOLUTION INTRAMUSCULAR; INTRAVENOUS; SUBCUTANEOUS AS NEEDED
Status: DISCONTINUED | OUTPATIENT
Start: 2023-01-17 | End: 2023-01-17 | Stop reason: HOSPADM

## 2023-01-17 RX ORDER — HYDROMORPHONE HYDROCHLORIDE 1 MG/ML
0.5 INJECTION, SOLUTION INTRAMUSCULAR; INTRAVENOUS; SUBCUTANEOUS
Status: DISCONTINUED | OUTPATIENT
Start: 2023-01-17 | End: 2023-01-17 | Stop reason: HOSPADM

## 2023-01-17 RX ORDER — LEVOFLOXACIN 500 MG/1
500 TABLET, FILM COATED ORAL DAILY
Qty: 7 TABLET | Refills: 0 | Status: SHIPPED | OUTPATIENT
Start: 2023-01-18 | End: 2023-01-25

## 2023-01-17 RX ORDER — NALOXONE HYDROCHLORIDE 4 MG/.1ML
SPRAY NASAL
Qty: 1 EACH | Refills: 0 | Status: SHIPPED | OUTPATIENT
Start: 2023-01-17

## 2023-01-17 RX ADMIN — GLYCOPYRROLATE 0.1 MG: 0.2 INJECTION INTRAMUSCULAR; INTRAVENOUS at 08:27

## 2023-01-17 RX ADMIN — SODIUM CHLORIDE, SODIUM LACTATE, POTASSIUM CHLORIDE, AND CALCIUM CHLORIDE: 600; 310; 30; 20 INJECTION, SOLUTION INTRAVENOUS at 09:52

## 2023-01-17 RX ADMIN — KETOROLAC TROMETHAMINE 30 MG: 15 INJECTION, SOLUTION INTRAMUSCULAR; INTRAVENOUS at 10:04

## 2023-01-17 RX ADMIN — MIDAZOLAM 2 MG: 1 INJECTION INTRAMUSCULAR; INTRAVENOUS at 08:27

## 2023-01-17 RX ADMIN — ONDANSETRON HYDROCHLORIDE 4 MG: 2 INJECTION INTRAMUSCULAR; INTRAVENOUS at 08:38

## 2023-01-17 RX ADMIN — LIDOCAINE HYDROCHLORIDE 100 MG: 20 INJECTION, SOLUTION INTRAVENOUS at 08:34

## 2023-01-17 RX ADMIN — KETAMINE HYDROCHLORIDE 20 MG: 10 INJECTION, SOLUTION INTRAMUSCULAR; INTRAVENOUS at 09:35

## 2023-01-17 RX ADMIN — LEVOFLOXACIN 500 MG: 5 INJECTION, SOLUTION INTRAVENOUS at 08:38

## 2023-01-17 RX ADMIN — KETAMINE HYDROCHLORIDE 30 MG: 10 INJECTION, SOLUTION INTRAMUSCULAR; INTRAVENOUS at 08:53

## 2023-01-17 RX ADMIN — PROPOFOL 200 MG: 10 INJECTION, EMULSION INTRAVENOUS at 08:34

## 2023-01-17 RX ADMIN — DEXAMETHASONE SODIUM PHOSPHATE 8 MG: 4 INJECTION, SOLUTION INTRAMUSCULAR; INTRAVENOUS at 08:38

## 2023-01-17 RX ADMIN — HYDROMORPHONE HYDROCHLORIDE 1 MG: 1 INJECTION, SOLUTION INTRAMUSCULAR; INTRAVENOUS; SUBCUTANEOUS at 08:38

## 2023-01-17 RX ADMIN — Medication 12.5 MG: at 08:38

## 2023-01-17 RX ADMIN — SODIUM CHLORIDE, SODIUM LACTATE, POTASSIUM CHLORIDE, AND CALCIUM CHLORIDE 125 ML/HR: 600; 310; 30; 20 INJECTION, SOLUTION INTRAVENOUS at 07:18

## 2023-01-17 NOTE — PERIOP NOTES
TRANSFER - OUT REPORT:    Verbal report given to Baldemar GOETZ (name) on [place]  being transferred to [facility] (unit) for routine post - op       Report consisted of patients Situation, Background, Assessment and   Recommendations(SBAR). Information from the following report(s) SBAR, Procedure Summary, Intake/Output, MAR, and Cardiac Rhythm NSR  was reviewed with the receiving nurse. Lines:   Peripheral IV 01/17/23 Anterior; Left Forearm (Active)   Site Assessment Clean, dry, & intact 01/17/23 1038   Phlebitis Assessment 0 01/17/23 1038   Infiltration Assessment 0 01/17/23 1038   Dressing Status Clean, dry, & intact 01/17/23 1038   Dressing Type Transparent 01/17/23 1038   Hub Color/Line Status Pink 01/17/23 0729        Opportunity for questions and clarification was provided.       Patient transported with:   Registered Nurse

## 2023-01-17 NOTE — OP NOTES
[facility]  OPERATIVE REPORT    Name:  Екатерина Rico  MR#:   [id number]  :  1955  ACCOUNT #:  [de-identified]  DATE OF SERVICE:  2023    PREOPERATIVE DIAGNOSIS:  Calyx of the kidney, bilateral.    POSTOPERATIVE DIAGNOSIS:  Calyx of the kidney, bilateral.    PROCEDURE PERFORMED:  Cystoscopy, bilateral retrograde pyelograms with interpretation, bilateral ureteroscopy with thulium laser lithotripsy, bilateral stent changes. SURGEON:  Nereyda Platt MD    ASSISTANT:  None. ANESTHESIA:  General.    COMPLICATIONS:  None. SPECIMENS REMOVED:  Left and right kidney stones. IMPLANTS:  A 4.8-Rwandan variable-length stent. ESTIMATED BLOOD LOSS:  Minimal.    DRAINS:  None. FINDINGS:  The patient had a right kidney with two stones, one was just easily removed and the other was lasered into a couple of small pieces and removed. There was still a large volume of stone in the left, especially in the lower pole. Those stone fragments were repositioned into the upper pole, lasered into smaller pieces and extracted. There were also still tons of debris that was small that still needed to be passed out of the kidney. At the end of the case, there were no significant size fragments, but the kidney was full of small debris. PROCEDURE:  Preoperatively, the risks and benefits of surgery were described to the patient. The risks included but were not limited to bleeding, infection, injury to the bladder, injury to the ureter, injury to the kidney, possible need for future procedure to be made stone-free. The patient understood the risks and signed the informed consent. The patient was taken to the operating room and placed on the OR table in supine position. He was administered general anesthetic. He was administered intravenous antibiotics. He was placed in dorsal lithotomy position, prepped and draped in the usual sterile manner.     A 22-Rwandan cystoscope and sheath were then inserted transurethrally atraumatically under direct vision using a 30-degree lens. Panendoscopy was done. Bilateral ureteral orifices were in normal anatomic position. There were stents emanating from each one. The prostate was mildly obstructive. There were few small stone fragments within the bladder. The urothelium of the bladder was completely normal without erythema or mass. I used alligator forceps and removed the stents from each kidney. I then reinserted the cystoscope. I then cannulated the left ureteral orifice with a 5-Italian open-ended ureteral catheter. Retrograde pyelogram was done in the usual manner using 12 mL of Optiray dye. There were no filling defects along the length of the ureter until I got to the area just distal to the UPJ. Proximal to this, there was a filling defect consistent with probable stone and there were filling defects in the lower pole consistent with known stone. There was mild hydronephrosis without blunting of calyces. Next, I passed a Sensor wire through the open-ended catheter up to the kidney. The open-ended catheter was removed. The scope was removed. I then passed a dual-lumen catheter. A second Sensor wire was passed through the dual-lumen catheter up to the kidney. The dual-lumen catheter was removed. One wire was a safety wire, the other wire was a working wire. Over the working wire, I easily passed a 13/15, 46-cm Navigator sheath over the wire into the proximal ureter until I came upon the area of filling defect. I removed the inner workings of the sheath and the working wire. I then passed a disposable flexible ureteroscope through the sheath up towards the kidney. I immediately came upon a stone in the proximal ureter. Using a Zero Tip basket, the stone was repositioned and easily extracted out. I then went up into the kidney. Panpyeloscopy was done.   There was tons of small debris located throughout the kidney without fragments that were too significant, except in the lower pole, there were large stones that had gone untreated from his previous trip to the operating room. Using an OptiFlex basket, I was able to reposition those stones from a very difficult spot in the lower pole into an upper pole calyx. Then I used a 200-micron thulium laser to break the stone into small dust and debris. The largest fragments were extracted using the Zero Tip basket. I went into every calyx and saw just fragments everywhere that were very small, a millimeter in size or less, but nothing of true significance anywhere within the kidney. I then slowly backed the scope and the sheath down the ureter. The ureter was intact, and there no significant fragments along the length of the ureter. I then reinserted the cystoscope. Over the safety wire, I passed a 4.8-Sao Tomean variable-length stent. The wire was removed. Fluoroscopy confirmed the proximal coil was in the kidney and the distal coil was noted to be in the bladder. Next, I turned my attention to the right side. I cannulated the right ureteral orifice with a 5-Sao Tomean open-ended ureteral catheter. Retrograde pyelogram was done in the usual manner using 14 mL of Optiray dye. There was a filling defect seen in the lower pole consistent with the stone. There were no filling defects along entire length of the ureter. There was no hydronephrosis or blunting of calyces. Next, I passed a Sensor wire through the open-ended catheter up to the kidney. The open-ended catheter was removed. The scope was removed. I then passed a dual-lumen catheter, passed a second Sensor up to the kidney. The dual-lumen catheter was removed. One wire was a safety wire and the other wire was a working wire. Over the working wire, I passed a 13/50 Navigator sheath 46 cm up into the proximal ureter. The inner workings of the sheath and the working wire were removed.   I then passed a flexible ureteroscope through the sheath up to the kidney. Panpyeloscopy was done. There was noted to be a stone in the midpole that was easily grabbed with a Zero Tip basket and removed down the sheath. I reinserted the cystoscope. I then went to the lower pole, found another stone. This was repositioned into a midpole calyx, lasered into two fragments which were then extracted with the Zero Tip basket. At this point, the scope and the sheath were removed. There were no fragments or stones along the entire length of the ureter. The ureter was intact and unharmed. I then reinserted the scope. Over the safety wire, I passed a 4.8-Amharic variable-length stent. The wire was removed. Fluoroscopy confirmed the proximal coil in the kidney. The distal coil was noted to be in the bladder by direct vision. At this point, the patient taken out of lithotomy position, revived from anesthesia, and taken to Recovery in stable condition.       Samy Mccall MD      [id number]  D:  01/17/2023 10:32  T:  01/17/2023 12:28  JOB #:  [id number]

## 2023-01-17 NOTE — DISCHARGE INSTRUCTIONS
Follow ALL directions given by Dr. Pastora Grajeda  Call DR. Sharma with ALL questions or concerns         Cystoscopy: What to Expect at [address]     A cystoscopy is a procedure that lets a doctor look inside of the bladder and the urethra. The urethra is the tube that carries urine from the bladder to outside the body. The doctor uses a thin, lighted tool called a cystoscope. Your bladder is filled with fluid. This stretches the bladder so that your doctor can look closely at the inside of your bladder. After the cystoscopy, your urethra may be sore at first, and it may burn when you urinate for the first few days after the procedure. You may feel the need to urinate more often, and your urine may be pink. These symptoms should get better in 1 or 2 days. You will probably be able to go back to most of your usual activities in 1 or 2 days. This care sheet gives you a general idea about how long it will take for you to recover. But each person recovers at a different pace. Follow the steps below to get better as quickly as possible. How can you care for yourself at home? Activity    Rest when you feel tired. Getting enough sleep will help you recover. Try to walk each day. Start by walking a little more than you did the day before. Bit by bit, increase the amount you walk. Walking boosts blood flow and helps prevent pneumonia and constipation. Avoid strenuous activities, such as bicycle riding, jogging, weight lifting, or aerobic exercise, until your doctor says it is okay. Ask your doctor when you can drive again. Most people are able to return to work within 1 or 2 days after the procedure. You may shower and take baths as usual.     Ask your doctor when it is okay for you to have sex. Diet    You can eat your normal diet. If your stomach is upset, try bland, low-fat foods like plain rice, broiled chicken, toast, and yogurt.      Drink plenty of fluids (unless your doctor tells you not to).   Medicines    Take pain medicines exactly as directed. If the doctor gave you a prescription medicine for pain, take it as prescribed. If you are not taking a prescription pain medicine, ask your doctor if you can take an over-the-counter medicine. If you think your pain medicine is making you sick to your stomach: Take your medicine after meals (unless your doctor has told you not to). Ask your doctor for a different pain medicine. If your doctor prescribed antibiotics, take them as directed. Do not stop taking them just because you feel better. You need to take the full course of antibiotics. Follow-up care is a key part of your treatment and safety. Be sure to make and go to all appointments, and call your doctor if you are having problems. It's also a good idea to know your test results and keep a list of the medicines you take. When should you call for help? Call 911 anytime you think you may need emergency care. For example, call if:    You passed out (lost consciousness). You have severe trouble breathing. You have sudden chest pain and shortness of breath, or you cough up blood. You have severe belly pain. Call your doctor now or seek immediate medical care if:    You are sick to your stomach or cannot keep fluids down. Your urine is still red or you see blood clots after you have urinated several times. You have trouble passing urine or stool, especially if you have pain or swelling in your lower belly. You have signs of a blood clot, such as:  Pain in your calf, back of the knee, thigh, or groin. Redness and swelling in your leg or groin. You develop a fever or severe chills. You have pain in your back just below your rib cage. This is called flank pain. Watch closely for changes in your health, and be sure to contact your doctor if:    You have pain or burning when you urinate.  A burning feeling is normal for a day or two after the test, but call if it does not get better. You have a frequent urge to urinate but can pass only small amounts of urine. Your urine is pink, red, or cloudy, or smells bad. It is normal for the urine to have a pinkish color for a few days after the test, but call if it does not get better. Where can you learn more? Go to http://www.gray.com/  Enter C842 in the search box to learn more about \"Cystoscopy: What to Expect at Home. \"  Current as of: June 16, 2022               Content Version: 13.4  © 0741-7159 [employer]. Care instructions adapted under license by [employer] (which disclaims liability or warranty for this information). If you have questions about a medical condition or this instruction, always ask your healthcare professional. [address] any warranty or liability for your use of this information. DISCHARGE SUMMARY from Nurse    PATIENT INSTRUCTIONS:    After general anesthesia or intravenous sedation, for 24 hours or while taking prescription Narcotics:  Limit your activities  Do not drive and operate hazardous machinery  Do not make important personal or business decisions  Do  not drink alcoholic beverages  If you have not urinated within 8 hours after discharge, please contact your surgeon on call. Report the following to your surgeon:  Excessive pain, swelling, redness or odor of or around the surgical area  Temperature over 100.5  Nausea and vomiting lasting longer than 4 hours or if unable to take medications  Any signs of decreased circulation or nerve impairment to extremity: change in color, persistent  numbness, tingling, coldness or increase pain  Any questions    What to do at Home:  Recommended activity: Ambulate in house and No lifting, Driving, or Strenuous exercise until advised    *  Please give a list of your current medications to your Primary Care Provider.     *  Please update this list whenever your medications are discontinued, doses are      changed, or new medications (including over-the-counter products) are added. *  Please carry medication information at all times in case of emergency situations. These are general instructions for a healthy lifestyle:    No smoking/ No tobacco products/ Avoid exposure to second hand smoke  Surgeon General's Warning:  Quitting smoking now greatly reduces serious risk to your health. Obesity, smoking, and sedentary lifestyle greatly increases your risk for illness    A healthy diet, regular physical exercise & weight monitoring are important for maintaining a healthy lifestyle    You may be retaining fluid if you have a history of heart failure or if you experience any of the following symptoms:  Weight gain of 3 pounds or more overnight or 5 pounds in a week, increased swelling in our hands or feet or shortness of breath while lying flat in bed. Please call your doctor as soon as you notice any of these symptoms; do not wait until your next office visit. Patient armband removed and shredded     The discharge information has been reviewed with the patient and caregiver. The patient and caregiver verbalized understanding. Discharge medications reviewed with the patient and caregiver and appropriate educational materials and side effects teaching were provided.   ___________________________________________________________________________________________________________________________________

## 2023-01-17 NOTE — BRIEF OP NOTE
Brief Postoperative Note    Patient: Cameron Burks  YOB: 1955  MRN: [id number]    Date of Procedure: 1/17/2023     Pre-Op Diagnosis: CALCULUS OF KIDNEY - BILATERAL    Post-Op Diagnosis: Same as preoperative diagnosis. Procedure(s):  CYSTOSCOPY, BILATERAL RETROGRADE PYELOGRAMS WITH INTERPRETATION, BILATERAL URETEROSCOPY WITH THULIUM LASER LITHOTRIPSY, BILATERAL STENT CHANGES W/C-ARM    Surgeon(s):  Vanessa Jenkins MD    Surgical Assistant: None    Anesthesia: General     Estimated Blood Loss (mL): Minimal    Complications: None    Specimens:   ID Type Source Tests Collected by Time Destination   1 : LEFT AND RIGHT KIDNEY STONES Fresh Kidney  Vanessa Jenkins MD 1/17/2023 1000 Pathology        Implants:   Implant Name Type Inv. Item Serial No.  Lot No. LRB No. Used Action   STENT URET 4.8FR [id number] PERCFLX HYDR+ SFT PGTL TAPR TIP - [id number]  STENT URET 4.8FR [id number] PERCFLX HYDR+ SFT PGTL TAPR TIP  [employer] [id number]_[id number] Left 1 Implanted       Drains: * No LDAs found *    Findings: RIGHT KIDNEY WITH 2 STONES,  ON WAS JUST REMOVED INTACT AND THE OTHER WAS LASERED AND REMOVED. THERE WAS STILL A LARGE VOLUME OF STONE IN THE LEFT LOWER POLE THAT WAS ABLE TO BE REPOSITIONED INTO THE UPPER POLE SO THAT IT COULD BE BETTER LASERED. THERE WAS STILL LOTS OF DEBRIS WITHIN THE KIDNEY.   AT THE END OF THE CASE, NO SIGNIFICANT FRAGMENTS WERE IDENTIFIED, BUT THERE WAS A LOT OF DEBRIS      Electronically Signed by Brendon Carnes MD on 1/17/2023 at 10:22 AM

## 2023-01-17 NOTE — ANESTHESIA POSTPROCEDURE EVALUATION
Post-Anesthesia Evaluation and Assessment    Cardiovascular Function/Vital Signs  Visit Vitals  BP (!) 140/75   Pulse (!) 55   Temp 36.1 °C (97 °F)   Resp 12   Ht 5' 9\" (1.753 m)   Wt 81 kg (178 lb 9.6 oz)   SpO2 97%   BMI 26.37 kg/m²       Patient is status post Procedure(s):  CYSTOSCOPY,BILATERAL RETROGRADE PYELOGRAM WITH INTERPRETATION,BILATERAL URETEROSCOPY WITH THULIUM LASER,BILATERAL STENT CHANGES W/C-ARM. Nausea/Vomiting: Controlled. Postoperative hydration reviewed and adequate. Pain:  Pain Scale 1: Numeric (0 - 10) (01/17/23 1039)  Pain Intensity 1: 3 (01/17/23 1039)   Managed. Neurological Status:   Neuro (WDL): Within Defined Limits (01/17/23 0707)   At baseline. Mental Status and Level of Consciousness: Baseline and appropriate for discharge. Pulmonary Status:   O2 Device: None (Room air) (01/17/23 1034)   Adequate oxygenation and airway patent. Complications related to anesthesia: None    Post-anesthesia assessment completed. No concerns. Patient has met all discharge requirements.     Signed By: Karthikeyan Ivy MD    January 17, 2023

## 2023-01-17 NOTE — H&P
[facility] [address] [address]  Tel: [phone]  Fax: [phone]    Patient : Tobin Vera   YOB: 1955                   Assessment/Plan  # Detail Type Description    Assessment Calculus of kidney (N20.0). Patient Plan His stone fragmented really well. We will do a cystoscopy and bilateral retrograde pyelogram and bilateral ureteroscopy with holmium laser lithotripsy and stent changes. Risks of bleeding and infection and injury and possible need for more than 1 procedure to made stone free were discussed. He is given a prescription for oxycodone as needed and Toradol 10 mg p.o. q.6 hours as needed         2. Assessment Acute cystitis without hematuria (N30.00). Patient Plan  a urine culture sent. Plan Orders The patient had the following order(s) completed today: UA In Office No Micro. Obtained on 01/06/2023, Interpretation: See module. 3. Assessment Other constipation (K59.09). Patient Plan He will use MiraLax. Proper use was discussed              Additional Visit Information      This 79year old male presents for Kidney stones and UTI. History of Present Illness  1. Kidney stones   Onset was 4 months ago. Severity level is moderate-severe. It occurs intermittently. The problem is worse. Location of pain is left flank. Prior stone type of calcium oxalate monohydrate and Calcium oxalate di-hydrate. Recent ER visit. Pertinent history includes history of prior stone(s). Associated symptoms include back pain, hematuria and pain. Pertinent negatives include bloating, chills, constipation, diarrhea, dysuria, fever, nausea, pelvic pain, penile pain, suprapubic pain, urinary blood clots, dribbling (urinary), frequency (urinary), urinary straining and vomiting. Additional information: New pt today with hx of urosepsis that he believes is from a UTI after a cystoscopy.  Admitted to [facility] in [address] [place] [address] treated with IV antibiotics in November 15, 2022. Patient was then seen 10/17/22 at [facility] for another episode of sepsis secondary to UTI. Patient has a known right staghorn calculus that measures 1.8 x 1.0 x 1.7 cm in the central left lower pole. He has multiple stones in the right kidney. .           Comments: 1/6/2023 -- He is 3 days status post left ureteroscopy with thulium  laser of his huge left staghorn stone and then bilateral stent placements. The stone fragmented very well. He denies fevers. However he has significant flank pain and intermittent hematuria and pain while voiding and dysuria. Positive constipation with no bowel movements in 3 days. He is almost finished with his Augmentin. 2.  UTI   The onset was 1 month ago. The severity of the problem is moderate. The problem is improving. Pertinent history includes being over 50 and alcohol consumption. Associated symptoms include hematuria. Pertinent negatives include constipation, dysuria, dribbling, urinary frequency, pelvic pain and penile pain. Additional information: He has been on amoxicillin 875 mg for 10 days total starting 12/14/2022.       12/21/2022 -- he denies any current fevers. Appetite is okay. Comments: 1/6/2023 --   No fevers. Positive dysuria with the stent in place. No nausea or emesis. Past Medical/Surgical History   (Reviewed, updated)  Disease/disorder Onset Date Management Date Comments     cysto, Left RPG with interpretation, left ureteroscopy with thulium laser lithotripsy (COMPLICATED MODIFER), and stent, right RPG with interpretation, right ureteral stent placement 01/03/2023      right wrist surgery         Problem List  Problem List reviewed.      Medications (active prior to today)  Medication Instructions Start Date Stop Date Refilled Elsewhere   tamsulosin 0.4 mg capsule take 1 capsule by oral route  every day 1/2 hour following the same meal each day 12/15/2022 01/06/2023  N   amoxicillin 875 mg-potassium clavulanate 125 mg tablet take 1 tablet by oral route  every 12 hours   -- start 2 days before surgery 12/21/2022   N   Pyridium 200 mg tablet take 1 tablet by oral route 3 times every day after meals 01/05/2023 01/06/2023 01/06/2023 N       Medication Reconciliation  Medications reconciled today. Allergies  Ingredient Reaction (Severity) Medication Name Comment   NO KNOWN ALLERGIES        Reviewed, no changes. Family History   (Reviewed, updated)      Social History  (Reviewed, updated)  Tobacco use reviewed. Preferred language is [place]. Marital Status/Family/Social Support  Marital status:      Smoking status: Unknown if ever smoked. Tobacco Screening  Patient has used tobacco. Patient has not used tobacco in the last 30 days. Smoking Status  Type Smoking Status Usage Per Day Years Used Pack Years Total Pack Years    Unknown if ever smoked         Alcohol  There is a history of alcohol use. Type: Beer. Caffeine  The patient uses caffeine: coffee. Review of Systems  System Neg/Pos Details   Constitutional Positive Pain. Constitutional Negative Chills and Fever. ENMT Negative Ear infections and Sore throat. Eyes Negative Blurred vision, Double vision and Eye pain. Respiratory Negative Asthma, Chronic cough, Dyspnea and Wheezing. Cardio Negative Chest pain. GI Negative Bloating, Constipation, Decreased appetite, Diarrhea, Nausea and Vomiting.  Positive Back pain, Hematuria.  Negative Dysuria, Pelvic pain, Penile pain, Suprapubic pain, Urinary blood clots, Urinary dribbling, Urinary frequency and Urinary straining. Endocrine Negative Cold intolerance, Heat intolerance, Increased thirst and Weight loss. Neuro Negative Headache and Tremors. Psych Negative Anxiety and Depression. Integumentary Negative Itching skin and Rash. MS Negative Back pain and Joint pain. Hema/Lymph Negative Easy bleeding.    Reproductive Negative Sexual dysfunction. Vital Signs   Height  Time ft in cm Last Measured Height Position   8:08 AM 5.0 9.00 175.26 12/15/2022 0     Weight/BSA/BMI  Time lb oz kg Context BMI kg/m2 BSA m2   8:08 .00  80.286 dressed with shoes 26.14      Measured by  Time Measured by   8:08 AM Haroon Whitley     Physical Exam  Exam Findings Details   Constitutional Normal Well developed. Neck Exam Normal Inspection - Normal.   Respiratory Normal Inspection - Normal.   Abdomen Normal No abdominal tenderness. Genitourinary * CVA Tenderness - Bilat. Genitourinary Normal No suprapubic tenderness. Extremity Normal No edema. Psychiatric Normal Orientation - Oriented to time, place, person & situation. Appropriate mood and affect.      Medications (added, continued, or stopped today)  Start Date Medication Directions PRN Status PRN Reason Instruction Stop Date   12/21/2022 amoxicillin 875 mg-potassium clavulanate 125 mg tablet take 1 tablet by oral route  every 12 hours   -- start 2 days before surgery N      01/06/2023 ketorolac 10 mg tablet take 1 tablet by oral route  every 6 hours as needed for up to 5 days total use N      01/06/2023 oxycodone-acetaminophen 5 mg-325 mg tablet take 1 tablet by oral route  every 6 hours as needed N      01/06/2023 Pyridium 200 mg tablet take 1 tablet by oral route 3 times every day after meals N      01/05/2023 Pyridium 200 mg tablet take 1 tablet by oral route 3 times every day after meals N   01/06/2023   12/15/2022 tamsulosin 0.4 mg capsule take 1 capsule by oral route  every day 1/2 hour following the same meal each day N   01/06/2023     Prescription Drug Monitoring Report: Accessed by Pati Deluca MD on 1/6/2023 8:34:09 AM    Active Patient Care Team Members  Name Contact Agency Type Support Role Relationship Active Date Inactive Date Specialty   Navid Seat   Patient provider PCP          Provider:      Candace Abraham MD

## 2023-01-17 NOTE — ANESTHESIA PREPROCEDURE EVALUATION
Relevant Problems   RENAL FAILURE   (+) Staghorn calculus       Anesthetic History   No history of anesthetic complications            Review of Systems / Medical History  Patient summary reviewed, nursing notes reviewed and pertinent labs reviewed    Pulmonary          Smoker (1/4 ppd - abstained this AM)         Neuro/Psych   Within defined limits           Cardiovascular  Within defined limits                Exercise tolerance: >4 METS     GI/Hepatic/Renal  Within defined limits           Pertinent negatives: No GERD   Endo/Other        Arthritis     Other Findings              Physical Exam    Airway  Mallampati: II  TM Distance: 4 - 6 cm  Neck ROM: normal range of motion   Mouth opening: Normal     Cardiovascular               Dental    Dentition: Edentulous     Pulmonary                 Abdominal         Other Findings            Anesthetic Plan    ASA: 2  Anesthesia type: general          Induction: Intravenous  Anesthetic plan and risks discussed with: Patient

## 2023-01-17 NOTE — PERIOP NOTES
Reviewed PTA medication list with patient/caregiver and patient/caregiver denies any additional medications. Patient admits to having a responsible adult care for them at home for at least 24 hours after surgery. Patient encouraged to use gown warming system and informed that using said warming gown to regulate body temperature prior to a procedure has been shown to help reduce the risks of blood clots and infection. Patient's pharmacy of choice verified and documented in PTA medication section.     Dual skin assessment & fall risk band verification completed with Stephen Dhaliwal RN.

## 2023-01-17 NOTE — PERIOP NOTES
Discharge instructions reviewed with patient and spouse, both verbalize understanding is the forms here for the request for the insulin pump  [phone] ext [phone] ashley  Fax [phone]

## 2023-01-17 NOTE — PERIOP NOTES
TRANSFER - IN REPORT:    Verbal report received from [place], RN(name) on [place]  being received from PACU(unit) for routine progression of care      Report consisted of patients Situation, Background, Assessment and   Recommendations(SBAR). Information from the following report(s) SBAR, Kardex, and MAR was reviewed with the receiving nurse. Opportunity for questions and clarification was provided. Assessment completed upon patients arrival to unit and care assumed.

## (undated) DEVICE — SOLUTION IRRIG 3000ML 0.9% SOD CHL FLX CONT 0797208] ICU MEDICAL INC]

## (undated) DEVICE — CYSTO PACK: Brand: MEDLINE INDUSTRIES, INC.

## (undated) DEVICE — TRAP MUCUS SPECIMEN 40ML -- MEDICHOICE

## (undated) DEVICE — BAG PRESSURE INFUSION 3 W STOPCOCK 3000 CC PREMIERPRO DISP

## (undated) DEVICE — Device

## (undated) DEVICE — GDWIRE 3CM FLX-TIP 0.038X150CM -- BX/5 SENSOR

## (undated) DEVICE — NITINOL STONE RETRIEVAL BASKET: Brand: ZERO TIP

## (undated) DEVICE — SINGLE-USE DIGITAL FLEXIBLE URETEROSCOPE: Brand: LITHOVUE

## (undated) DEVICE — STRAP,POSITIONING,KNEE/BODY,FOAM,4X60": Brand: MEDLINE

## (undated) DEVICE — GUIDEWIRE UROLOGICAL STR 3 CM 0.038 INX150 CM DUAL-FLEX

## (undated) DEVICE — GLOVE ORANGE PI 7 1/2   MSG9075

## (undated) DEVICE — CATHETER URET L70CM OD6FR OPN END FLEXIMA

## (undated) DEVICE — URETERAL ACCESS SHEATH SET: Brand: NAVIGATOR HD

## (undated) DEVICE — DRAPE C ARM UNIV W41XL74IN CLR PLAS XR VELC CLSR POLY STRP

## (undated) DEVICE — SEAL ENDOSCP INSTR 7FR BX SELF SEAL

## (undated) DEVICE — GARMENT,MEDLINE,DVT,INT,CALF,MED, GEN2: Brand: MEDLINE

## (undated) DEVICE — NITINOL STONE RETRIEVAL BASKET: Brand: OPTIFLEX

## (undated) DEVICE — DUAL LUMEN URETERAL CATHETER